# Patient Record
Sex: FEMALE | Race: BLACK OR AFRICAN AMERICAN | Employment: FULL TIME | URBAN - METROPOLITAN AREA
[De-identification: names, ages, dates, MRNs, and addresses within clinical notes are randomized per-mention and may not be internally consistent; named-entity substitution may affect disease eponyms.]

---

## 2019-07-19 ENCOUNTER — DOCUMENTATION ONLY (OUTPATIENT)
Dept: NUTRITION | Age: 44
End: 2019-07-19

## 2019-07-19 NOTE — PROGRESS NOTES
Nutrition Counseling:  Pt referred by [facility] for gasteroparesis. Pt did not show for initial appt today. Will attempt to reschedule.     Brodie Stallings RD, LD   Office: [phone]

## 2019-08-01 ENCOUNTER — DOCUMENTATION ONLY (OUTPATIENT)
Dept: NUTRITION | Age: 44
End: 2019-08-01

## 2019-08-01 NOTE — PROGRESS NOTES
Nutrition Counseling:  Pt referred by Dr. Lidia Perdomo. Pt did not show for initial appt today (second occasion).     Lashonda Theodore, MS, [address], [address], [place]  W: [phone]  C: [phone]

## 2019-08-21 ENCOUNTER — DOCUMENTATION ONLY (OUTPATIENT)
Dept: NUTRITION | Age: 44
End: 2019-08-21

## 2019-08-21 NOTE — PROGRESS NOTES
Nutrition Counseling: Patient referred by Dr. Leanna Kelly. Pt did not show for 11am appt today.     Lei Tsang RD, [address], [address]  Outpatient Dietitian  Office: [phone]

## 2019-09-19 ENCOUNTER — TELEPHONE (OUTPATIENT)
Dept: NUTRITION | Age: 44
End: 2019-09-19

## 2019-09-19 NOTE — TELEPHONE ENCOUNTER
Nutrition Counseling: Contacted pt regarding referral. See notes documented in Nutrition Counseling Referral for details. No further follow-up contact from pt. Will close referral for this office and notify referring provider.     [facility]  [phone]

## 2019-12-05 ENCOUNTER — TELEPHONE (OUTPATIENT)
Dept: NUTRITION | Age: 44
End: 2019-12-05

## 2019-12-05 ENCOUNTER — DOCUMENTATION ONLY (OUTPATIENT)
Dept: NUTRITION | Age: 44
End: 2019-12-05

## 2019-12-05 NOTE — TELEPHONE ENCOUNTER
Nutrition Counseling: Contacted pt regarding referral. See notes documented in Nutrition Counseling Referral for details. No further follow-up contact from pt. Will close referral for this office.     [address] [facility]  [phone]

## 2019-12-05 NOTE — PROGRESS NOTES
Nutrition Counseling:  Pt referred by Dr. Johnathon Tracy. Pt did not show for initial appt today. This is the 3rd no-show. Pt may not be ready for nutrition counseling appt.       Scotty Hernández Addison [address], [address] [place] [address], [address], 5 [facility]

## 2021-02-12 ENCOUNTER — HOSPITAL ENCOUNTER (EMERGENCY)
Age: 46
Discharge: HOME | End: 2021-02-12
Attending: EMERGENCY MEDICINE

## 2021-02-12 VITALS
HEIGHT: 65 IN | TEMPERATURE: 98.5 F | SYSTOLIC BLOOD PRESSURE: 154 MMHG | DIASTOLIC BLOOD PRESSURE: 87 MMHG | WEIGHT: 240 LBS | OXYGEN SATURATION: 94 % | RESPIRATION RATE: 16 BRPM | BODY MASS INDEX: 39.99 KG/M2 | HEART RATE: 94 BPM

## 2021-02-12 DIAGNOSIS — E11.43 DIABETIC GASTROPARESIS (HCC): Primary | ICD-10-CM

## 2021-02-12 DIAGNOSIS — I16.0 HYPERTENSIVE URGENCY: ICD-10-CM

## 2021-02-12 DIAGNOSIS — R73.9 HYPERGLYCEMIA: ICD-10-CM

## 2021-02-12 DIAGNOSIS — K31.84 DIABETIC GASTROPARESIS (HCC): Primary | ICD-10-CM

## 2021-02-12 LAB
ALBUMIN SERPL-MCNC: 4 G/DL (ref 3.5–5)
ALBUMIN/GLOB SERPL: 0.8 {RATIO} (ref 1.2–3.5)
ALP SERPL-CCNC: 124 U/L (ref 50–136)
ALT SERPL-CCNC: 45 U/L (ref 12–65)
ANION GAP SERPL CALC-SCNC: 13 MMOL/L (ref 7–16)
AST SERPL-CCNC: 40 U/L (ref 15–37)
BASOPHILS # BLD: 0 K/UL (ref 0–0.2)
BASOPHILS NFR BLD: 0 % (ref 0–2)
BILIRUB SERPL-MCNC: 0.5 MG/DL (ref 0.2–1.1)
BUN SERPL-MCNC: 14 MG/DL (ref 6–23)
CALCIUM SERPL-MCNC: 10.1 MG/DL (ref 8.3–10.4)
CHLORIDE SERPL-SCNC: 102 MMOL/L (ref 98–107)
CO2 SERPL-SCNC: 23 MMOL/L (ref 21–32)
CREAT SERPL-MCNC: 0.92 MG/DL (ref 0.6–1)
DIFFERENTIAL METHOD BLD: ABNORMAL
EOSINOPHIL # BLD: 0.1 K/UL (ref 0–0.8)
EOSINOPHIL NFR BLD: 1 % (ref 0.5–7.8)
ERYTHROCYTE [DISTWIDTH] IN BLOOD BY AUTOMATED COUNT: 13.3 % (ref 11.9–14.6)
GLOBULIN SER CALC-MCNC: 5.3 G/DL (ref 2.3–3.5)
GLUCOSE SERPL-MCNC: 221 MG/DL (ref 65–100)
HCT VFR BLD AUTO: 46.9 % (ref 35.8–46.3)
HGB BLD-MCNC: 15.5 G/DL (ref 11.7–15.4)
IMM GRANULOCYTES # BLD AUTO: 0 K/UL (ref 0–0.5)
IMM GRANULOCYTES NFR BLD AUTO: 0 % (ref 0–5)
LIPASE SERPL-CCNC: 76 U/L (ref 73–393)
LYMPHOCYTES # BLD: 2.7 K/UL (ref 0.5–4.6)
LYMPHOCYTES NFR BLD: 28 % (ref 13–44)
MCH RBC QN AUTO: 30.9 PG (ref 26.1–32.9)
MCHC RBC AUTO-ENTMCNC: 33 G/DL (ref 31.4–35)
MCV RBC AUTO: 93.6 FL (ref 79.6–97.8)
MONOCYTES # BLD: 0.8 K/UL (ref 0.1–1.3)
MONOCYTES NFR BLD: 8 % (ref 4–12)
NEUTS SEG # BLD: 6.1 K/UL (ref 1.7–8.2)
NEUTS SEG NFR BLD: 63 % (ref 43–78)
NRBC # BLD: 0 K/UL (ref 0–0.2)
PLATELET # BLD AUTO: 342 K/UL (ref 150–450)
PLATELET COMMENTS,PCOM: ADEQUATE
PMV BLD AUTO: 11.6 FL (ref 9.4–12.3)
POTASSIUM SERPL-SCNC: 4.2 MMOL/L (ref 3.5–5.1)
PROT SERPL-MCNC: 9.3 G/DL (ref 6.3–8.2)
RBC # BLD AUTO: 5.01 M/UL (ref 4.05–5.2)
RBC MORPH BLD: ABNORMAL
SODIUM SERPL-SCNC: 138 MMOL/L (ref 136–145)
WBC # BLD AUTO: 9.7 K/UL (ref 4.3–11.1)
WBC MORPH BLD: ABNORMAL

## 2021-02-12 PROCEDURE — 96374 THER/PROPH/DIAG INJ IV PUSH: CPT

## 2021-02-12 PROCEDURE — 93005 ELECTROCARDIOGRAM TRACING: CPT | Performed by: EMERGENCY MEDICINE

## 2021-02-12 PROCEDURE — 96375 TX/PRO/DX INJ NEW DRUG ADDON: CPT

## 2021-02-12 PROCEDURE — 99285 EMERGENCY DEPT VISIT HI MDM: CPT

## 2021-02-12 PROCEDURE — 80053 COMPREHEN METABOLIC PANEL: CPT

## 2021-02-12 PROCEDURE — 85025 COMPLETE CBC W/AUTO DIFF WBC: CPT

## 2021-02-12 PROCEDURE — 74011000250 HC RX REV CODE- 250: Performed by: EMERGENCY MEDICINE

## 2021-02-12 PROCEDURE — 83690 ASSAY OF LIPASE: CPT

## 2021-02-12 PROCEDURE — 74011250636 HC RX REV CODE- 250/636: Performed by: EMERGENCY MEDICINE

## 2021-02-12 PROCEDURE — 96376 TX/PRO/DX INJ SAME DRUG ADON: CPT

## 2021-02-12 RX ORDER — LABETALOL HYDROCHLORIDE 5 MG/ML
40 INJECTION, SOLUTION INTRAVENOUS
Status: COMPLETED | OUTPATIENT
Start: 2021-02-12 | End: 2021-02-12

## 2021-02-12 RX ORDER — LABETALOL HYDROCHLORIDE 5 MG/ML
60 INJECTION, SOLUTION INTRAVENOUS
Status: COMPLETED | OUTPATIENT
Start: 2021-02-12 | End: 2021-02-12

## 2021-02-12 RX ORDER — ONDANSETRON 4 MG/1
4 TABLET, ORALLY DISINTEGRATING ORAL
Qty: 20 TAB | Refills: 0 | Status: SHIPPED | OUTPATIENT
Start: 2021-02-12

## 2021-02-12 RX ORDER — ONDANSETRON 2 MG/ML
4 INJECTION INTRAMUSCULAR; INTRAVENOUS
Status: DISCONTINUED | OUTPATIENT
Start: 2021-02-12 | End: 2021-02-12

## 2021-02-12 RX ORDER — ONDANSETRON 2 MG/ML
4 INJECTION INTRAMUSCULAR; INTRAVENOUS
Status: COMPLETED | OUTPATIENT
Start: 2021-02-12 | End: 2021-02-12

## 2021-02-12 RX ORDER — HALOPERIDOL 5 MG/ML
5 INJECTION INTRAMUSCULAR ONCE
Status: COMPLETED | OUTPATIENT
Start: 2021-02-12 | End: 2021-02-12

## 2021-02-12 RX ORDER — LABETALOL HYDROCHLORIDE 5 MG/ML
20 INJECTION, SOLUTION INTRAVENOUS
Status: COMPLETED | OUTPATIENT
Start: 2021-02-12 | End: 2021-02-12

## 2021-02-12 RX ORDER — NALBUPHINE HYDROCHLORIDE 10 MG/ML
10 INJECTION, SOLUTION INTRAMUSCULAR; INTRAVENOUS; SUBCUTANEOUS
Status: COMPLETED | OUTPATIENT
Start: 2021-02-12 | End: 2021-02-12

## 2021-02-12 RX ADMIN — SODIUM CHLORIDE 1000 ML: 900 INJECTION, SOLUTION INTRAVENOUS at 19:24

## 2021-02-12 RX ADMIN — LABETALOL HYDROCHLORIDE 60 MG: 5 INJECTION INTRAVENOUS at 21:25

## 2021-02-12 RX ADMIN — NALBUPHINE HYDROCHLORIDE 10 MG: 10 INJECTION, SOLUTION INTRAMUSCULAR; INTRAVENOUS; SUBCUTANEOUS at 21:24

## 2021-02-12 RX ADMIN — LABETALOL HYDROCHLORIDE 40 MG: 5 INJECTION INTRAVENOUS at 20:00

## 2021-02-12 RX ADMIN — ONDANSETRON 4 MG: 2 INJECTION INTRAMUSCULAR; INTRAVENOUS at 21:24

## 2021-02-12 RX ADMIN — LABETALOL HYDROCHLORIDE 20 MG: 5 INJECTION INTRAVENOUS at 19:24

## 2021-02-12 RX ADMIN — HALOPERIDOL LACTATE 5 MG: 5 INJECTION, SOLUTION INTRAMUSCULAR at 19:24

## 2021-02-12 NOTE — ED TRIAGE NOTES
Pt ambulatory to triage. Pt reports abd pain in upper mid abd and vomiting and diarrhea without fever since this morning. Pt has hx of gastroparesis and normally goes to [facility]. Pt states she had EGD Monday at [facility] for same symptoms. Hx HTN and took meds today but states has been vomiting all day. Green and lavender sent to lab. IV unsuccessful x 2 in triage.

## 2021-02-12 NOTE — ED PROVIDER NOTES
54-year-old female with history of diabetic gastroparesis presents with abdominal pain, nausea, vomiting, and diarrhea since yesterday. She was discharged in the hospital 2 days ago for diabetic gastroparesis and hypertensive urgency. She states medications are not helping. No new symptoms. No new fevers. [facility] dc summary: \"Inpatient Discharge Summary    Admitting Provider: Stevphen Baumgarten, MD  Discharge Provider: Damaso Mancia MD  Primary Care Physician at Discharge: Albert Reid [phone]     Admission Date: 2/5/2021 Discharge Date: 2/10/2021    Primary Discharge Diagnosis  1. Diabetic gastroparesis  2. Intractable nausea/vomiting  3. Abdominal pain    Secondary Discharge Diagnosis  ? Hypertension  ? DM2  ? Frequent PVCs: H/O ablation  ? Hypothyroidism  ? Hyperlipidemia  ? FER  ? Peripheral neuropathy  ? Depression    CONSULTANTS  1. Gastroenterology    PROCEDURES/DATA  1. EGD (02/08/2021)-retained food with patent pylorus and no ulcers    Presenting Problem/History of Present Illness  For complete details of his initial presentation, please refer to the H&P. Hospital Course  1. Gastroparesis; nausea/vomiting; epigastric pain--patient was admitted to [address] for nausea, vomiting, and abdominal pain. She has a known history of diabetic gastroparesis. Gastroenterology was consulted and subsequent EGD was performed on 02/08/2021 that was unremarkable. Patient was given a analgesics and standing Reglan and her symptomatology improved. Her diet was advanced and she was able to tolerate this well. She was changed to oral Reglan and continue to tolerate a regular diet. She was felt stable for discharge on 02/10/2021    2. Hypertensive urgency: Patient's blood pressures were markedly elevated on admission. With improvement of her abdominal pain and resumption of her home medications, her blood pressure markedly improved.  Unfortunately, she actually became hypotensive with the resumption of all her home medications, and her medications were adjusted. This can be reassessed as an outpatient. \"      Abdominal Pain   Associated symptoms include diarrhea, nausea and vomiting. Pertinent negatives include no fever, no headaches, no chest pain and no back pain. Vomiting   Associated symptoms include abdominal pain and diarrhea. Pertinent negatives include no chills, no fever, no headaches, no cough and no headaches. No past medical history on file. No past surgical history on file. No family history on file. Social History     Socioeconomic History    Marital status: SINGLE     Spouse name: Not on file    Number of children: Not on file    Years of education: Not on file    Highest education level: Not on file   Occupational History    Not on file   Social Needs    Financial resource strain: Not on file    Food insecurity     Worry: Not on file     Inability: Not on file    Transportation needs     Medical: Not on file     Non-medical: Not on file   Tobacco Use    Smoking status: Not on file   Substance and Sexual Activity    Alcohol use: Not on file    Drug use: Not on file    Sexual activity: Not on file   Lifestyle    Physical activity     Days per week: Not on file     Minutes per session: Not on file    Stress: Not on file   Relationships    Social connections     Talks on phone: Not on file     Gets together: Not on file     Attends [employer]ism service: Not on file     Active member of club or organization: Not on file     Attends meetings of clubs or organizations: Not on file     Relationship status: Not on file    Intimate partner violence     Fear of current or ex partner: Not on file     Emotionally abused: Not on file     Physically abused: Not on file     Forced sexual activity: Not on file   Other Topics Concern    Not on file   Social History Narrative    Not on file         ALLERGIES: Patient has no known allergies.     Review of Systems   Constitutional: Negative for chills and fever.   HENT: Negative for hearing loss.    Eyes: Negative for visual disturbance.   Respiratory: Negative for cough and shortness of breath.    Cardiovascular: Negative for chest pain and palpitations.   Gastrointestinal: Positive for abdominal pain, diarrhea, nausea and vomiting.   Musculoskeletal: Negative for back pain.   Skin: Negative for rash.   Neurological: Negative for weakness and headaches.   Psychiatric/Behavioral: Negative for confusion.       Vitals:    02/12/21 1745 02/12/21 1750   BP:  (!) 231/136   Pulse: (!) 115    Resp: 16    Temp: 99.4 °F (37.4 °C)    SpO2: 98%    Weight: 108.9 kg (240 lb)    Height: 5' 5\" (1.651 m)             Physical Exam  Vitals signs and nursing note reviewed.   Constitutional:       Appearance: She is well-developed.   HENT:      Head: Normocephalic and atraumatic.   Eyes:      Pupils: Pupils are equal, round, and reactive to light.   Neck:      Musculoskeletal: Normal range of motion and neck supple.   Cardiovascular:      Rate and Rhythm: Regular rhythm. Tachycardia present.      Heart sounds: Normal heart sounds.   Pulmonary:      Effort: Pulmonary effort is normal.      Breath sounds: Normal breath sounds.   Abdominal:      Palpations: Abdomen is soft.      Tenderness: There is generalized abdominal tenderness.   Musculoskeletal: Normal range of motion.   Skin:     General: Skin is warm and dry.   Neurological:      Mental Status: She is alert.   Psychiatric:         Behavior: Behavior normal.          MDM  Number of Diagnoses or Management Options  Diagnosis management comments: Parts of this document were created using dragon voice recognition software. The chart has been reviewed but errors may still be present.  I wore appropriate PPE throughout this patient's ED visit. Theodora Cabrera MD, 6:54 PM    Rocking in bed.  Mild diffuse abdominal tenderness.  Will try Haldol.  Labs unremarkable except for elevated  glucose. 10:23 PM  BP improved. No vomiting. Pain resolved. Already followed by GI and pain management     I discussed the results of all labs, procedures, radiographs, and treatments with the patient and available family. Treatment plan is agreed upon and the patient is ready for discharge. Questions about treatment in the ED and differential diagnosis of presenting condition were answered. Patient was given verbal discharge instructions including, but not limited to, importance of returning to the emergency department for any concern of worsening or continued symptoms. Instructions were given to follow up with a primary care provider or specialist within 1-2 days. Adverse effects of medications, if prescribed, were discussed and patient was advised to refrain from significant physical activity until followed up by primary care physician and to not drive or operate heavy machinery after taking any sedating substances.            Amount and/or Complexity of Data Reviewed  Clinical lab tests: ordered and reviewed (Results for orders placed or performed during the hospital encounter of 02/12/21  -CBC WITH AUTOMATED DIFF       Result                      Value             Ref Range           WBC                         9.7               4.3 - 11.1 K*       RBC                         5.01              4.05 - 5.2 M*       HGB                         15.5 (H)          11.7 - 15.4 *       HCT                         46.9 (H)          35.8 - 46.3 %       MCV                         93.6              79.6 - 97.8 *       MCH                         30.9              26.1 - 32.9 *       MCHC                        33.0              31.4 - 35.0 *       RDW                         13.3              11.9 - 14.6 %       PLATELET                    342               150 - 450 K/*       MPV                         11.6              9.4 - 12.3 FL       ABSOLUTE NRBC               0.00              0.0 - 0.2 K/* NEUTROPHILS                 63                43 - 78 %           LYMPHOCYTES                 28                13 - 44 %           MONOCYTES                   8                 4.0 - 12.0 %        EOSINOPHILS                 1                 0.5 - 7.8 %         BASOPHILS                   0                 0.0 - 2.0 %         IMMATURE GRANULOCYTES       0                 0.0 - 5.0 %         ABS. NEUTROPHILS            6.1               1.7 - 8.2 K/*       ABS. LYMPHOCYTES            2.7               0.5 - 4.6 K/*       ABS. MONOCYTES              0.8               0.1 - 1.3 K/*       ABS. EOSINOPHILS            0.1               0.0 - 0.8 K/*       ABS. BASOPHILS              0.0               0.0 - 0.2 K/*       ABS. IMM.  GRANS.            0.0               0.0 - 0.5 K/*       RBC COMMENTS                                                  SLIGHT   ANISOCYTOSIS + POIKILOCYTOSIS          WBC COMMENTS                                                  Result Confirmed By Smear       PLATELET COMMENTS           ADEQUATE                              DF                          AUTOMATED                        -METABOLIC PANEL, COMPREHENSIVE       Result                      Value             Ref Range           Sodium                      138               136 - 145 mm*       Potassium                   4.2               3.5 - 5.1 mm*       Chloride                    102               98 - 107 mmo*       CO2                         23                21 - 32 mmol*       Anion gap                   13                7 - 16 mmol/L       Glucose                     221 (H)           65 - 100 mg/*       BUN                         14                6 - 23 MG/DL        Creatinine                  0.92              0.6 - 1.0 MG*       GFR est AA                  >60               >60 ml/min/1*       GFR est non-AA              >60               >60 ml/min/1*       Calcium                     10.1              8.3 - 10.4 M* Bilirubin, total            0.5               0.2 - 1.1 MG*       ALT (SGPT)                  45                12 - 65 U/L         AST (SGOT)                  40 (H)            15 - 37 U/L         Alk.  phosphatase            124               50 - 136 U/L        Protein, total              9.3 (H)           6.3 - 8.2 g/*       Albumin                     4.0               3.5 - 5.0 g/*       Globulin                    5.3 (H)           2.3 - 3.5 g/*       A-G Ratio                   0.8 (L)           1.2 - 3.5      -LIPASE       Result                      Value             Ref Range           Lipase                      76                73 - 393 U/L   -EKG, 12 LEAD, INITIAL       Result                      Value             Ref Range           Ventricular Rate            117               BPM                 Atrial Rate                 117               BPM                 P-R Interval                126               ms                  QRS Duration                76                ms                  Q-T Interval                342               ms                  QTC Calculation (Bezet)     477               ms                  Calculated P Axis           63                degrees             Calculated R Axis           85                degrees             Calculated T Axis           23                degrees             Diagnosis                                                     Sinus tachycardia   Low voltage QRS   Possible Anterolateral infarct , age undetermined   Abnormal ECG   No previous ECGs available     )  Tests in the medicine section of CPT®: reviewed and ordered           EKG    Date/Time: 2/12/2021 10:27 PM  Performed by: Romie Pollack MD  Authorized by: Romie Pollack MD     ECG reviewed by ED Physician in the absence of a cardiologist: yes    Rate:     ECG rate:  117    ECG rate assessment: tachycardic    Rhythm:     Rhythm: sinus rhythm    Ectopy:     Ectopy: none    ST segments:     ST segments:  Normal

## 2021-02-13 LAB
ATRIAL RATE: 117 BPM
CALCULATED P AXIS, ECG09: 63 DEGREES
CALCULATED R AXIS, ECG10: 85 DEGREES
CALCULATED T AXIS, ECG11: 23 DEGREES
DIAGNOSIS, 93000: NORMAL
P-R INTERVAL, ECG05: 126 MS
Q-T INTERVAL, ECG07: 342 MS
QRS DURATION, ECG06: 76 MS
QTC CALCULATION (BEZET), ECG08: 477 MS
VENTRICULAR RATE, ECG03: 117 BPM

## 2021-02-13 NOTE — DISCHARGE INSTRUCTIONS
Follow-up with GI and pain management. Return for worsening or concerning symptoms. Monitor blood pressure and blood sugar.

## 2021-02-13 NOTE — ED NOTES
I have reviewed discharge instructions with the patient. The patient verbalized understanding. Patient left ED via Discharge Method: ambulatory to Home with transport from [facility]. The patient is ambulatory upon exit and appears in no acute distress. The patient has been provided discharge instructions, prescription, and follow up information. The patient does not have any questions at this time. Opportunity for questions and clarification provided. Patient given 1 scripts. To continue your aftercare when you leave the hospital, you may receive an automated call from our care team to check in on how you are doing. This is a free service and part of our promise to provide the best care and service to meet your aftercare needs.  If you have questions, or wish to unsubscribe from this service please call [phone]. Thank you for Choosing our [facility] Emergency Department.

## 2021-02-13 NOTE — ED NOTES
Patient report received from [facility]. Patient care to be assumed at this time.